# Patient Record
Sex: FEMALE | Race: BLACK OR AFRICAN AMERICAN | NOT HISPANIC OR LATINO | URBAN - METROPOLITAN AREA
[De-identification: names, ages, dates, MRNs, and addresses within clinical notes are randomized per-mention and may not be internally consistent; named-entity substitution may affect disease eponyms.]

---

## 2024-12-04 ENCOUNTER — APPOINTMENT (OUTPATIENT)
Dept: BEHAVIORAL HEALTH | Facility: CLINIC | Age: 89
End: 2024-12-04
Payer: MEDICARE

## 2024-12-04 VITALS
HEART RATE: 80 BPM | DIASTOLIC BLOOD PRESSURE: 74 MMHG | TEMPERATURE: 97.3 F | WEIGHT: 185 LBS | SYSTOLIC BLOOD PRESSURE: 148 MMHG

## 2024-12-04 DIAGNOSIS — F03.B0: ICD-10-CM

## 2024-12-04 PROBLEM — N05.9 GLOMERULONEPHRITIS: Status: ACTIVE | Noted: 2024-12-04

## 2024-12-04 PROBLEM — M19.90 ARTHRITIS: Status: ACTIVE | Noted: 2024-12-04

## 2024-12-04 PROBLEM — I10 HTN (HYPERTENSION): Status: ACTIVE | Noted: 2024-12-04

## 2024-12-04 PROBLEM — Z79.01 ANTICOAGULATED ON COUMADIN: Status: ACTIVE | Noted: 2024-12-04

## 2024-12-04 PROBLEM — E03.8 OTHER SPECIFIED HYPOTHYROIDISM: Status: ACTIVE | Noted: 2024-12-04

## 2024-12-04 PROBLEM — M51.369 LUMBAR DEGENERATIVE DISC DISEASE: Status: ACTIVE | Noted: 2024-12-04

## 2024-12-04 PROBLEM — I25.2 HISTORY OF HEART ATTACK: Status: ACTIVE | Noted: 2024-12-04

## 2024-12-04 PROBLEM — R29.6 FALLS: Status: ACTIVE | Noted: 2024-12-04

## 2024-12-04 PROCEDURE — 1159F MED LIST DOCD IN RCRD: CPT | Performed by: PSYCHIATRY & NEUROLOGY

## 2024-12-04 PROCEDURE — 3077F SYST BP >= 140 MM HG: CPT | Performed by: PSYCHIATRY & NEUROLOGY

## 2024-12-04 PROCEDURE — 1126F AMNT PAIN NOTED NONE PRSNT: CPT | Performed by: PSYCHIATRY & NEUROLOGY

## 2024-12-04 PROCEDURE — 90792 PSYCH DIAG EVAL W/MED SRVCS: CPT | Performed by: PSYCHIATRY & NEUROLOGY

## 2024-12-04 PROCEDURE — 1160F RVW MEDS BY RX/DR IN RCRD: CPT | Performed by: PSYCHIATRY & NEUROLOGY

## 2024-12-04 PROCEDURE — 1157F ADVNC CARE PLAN IN RCRD: CPT | Performed by: PSYCHIATRY & NEUROLOGY

## 2024-12-04 PROCEDURE — 3078F DIAST BP <80 MM HG: CPT | Performed by: PSYCHIATRY & NEUROLOGY

## 2024-12-04 RX ORDER — LEVOTHYROXINE SODIUM 50 UG/1
50 TABLET ORAL DAILY
COMMUNITY

## 2024-12-04 RX ORDER — LACTULOSE 10 G/15ML
SOLUTION ORAL
COMMUNITY

## 2024-12-04 RX ORDER — WARFARIN 6 MG/1
6 TABLET ORAL
COMMUNITY

## 2024-12-04 RX ORDER — DILTIAZEM HYDROCHLORIDE 180 MG/1
180 CAPSULE, COATED, EXTENDED RELEASE ORAL DAILY
COMMUNITY

## 2024-12-04 RX ORDER — DONEPEZIL HYDROCHLORIDE 5 MG/1
TABLET, FILM COATED ORAL
Qty: 28 TABLET | Refills: 0 | Status: SHIPPED | OUTPATIENT
Start: 2024-12-04

## 2024-12-04 RX ORDER — ATORVASTATIN CALCIUM 40 MG/1
40 TABLET, FILM COATED ORAL DAILY
COMMUNITY

## 2024-12-04 RX ORDER — MECLIZINE HYDROCHLORIDE 25 MG/1
25 TABLET ORAL 3 TIMES DAILY PRN
COMMUNITY

## 2024-12-04 RX ORDER — WARFARIN 4 MG/1
4.5 TABLET ORAL 2 TIMES WEEKLY
COMMUNITY

## 2024-12-04 RX ORDER — DONEPEZIL HYDROCHLORIDE 10 MG/1
TABLET, FILM COATED ORAL
Qty: 30 TABLET | Refills: 1 | Status: SHIPPED | OUTPATIENT
Start: 2024-12-04

## 2024-12-04 ASSESSMENT — ENCOUNTER SYMPTOMS
CONFUSION: 1
HYPERACTIVE: 0
DYSPHORIC MOOD: 0
DIZZINESS: 1
AGITATION: 0
FATIGUE: 1
SEIZURES: 0
HALLUCINATIONS: 0
SLEEP DISTURBANCE: 0
TREMORS: 0
NERVOUS/ANXIOUS: 0
DECREASED CONCENTRATION: 1
APPETITE CHANGE: 0

## 2024-12-04 ASSESSMENT — COLUMBIA-SUICIDE SEVERITY RATING SCALE - C-SSRS
1. SINCE LAST CONTACT, HAVE YOU WISHED YOU WERE DEAD OR WISHED YOU COULD GO TO SLEEP AND NOT WAKE UP?: NO
2. HAVE YOU ACTUALLY HAD ANY THOUGHTS OF KILLING YOURSELF?: NO
1. HAVE YOU WISHED YOU WERE DEAD OR WISHED YOU COULD GO TO SLEEP AND NOT WAKE UP?: NO
2. HAVE YOU ACTUALLY HAD ANY THOUGHTS OF KILLING YOURSELF?: NO

## 2024-12-04 ASSESSMENT — PAIN SCALES - GENERAL: PAINLEVEL_OUTOF10: 0-NO PAIN
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
## 2025-03-05 ENCOUNTER — APPOINTMENT (OUTPATIENT)
Dept: BEHAVIORAL HEALTH | Facility: CLINIC | Age: 89
End: 2025-03-05
Payer: MEDICARE

## 2025-04-16 ENCOUNTER — APPOINTMENT (OUTPATIENT)
Dept: BEHAVIORAL HEALTH | Facility: CLINIC | Age: 89
End: 2025-04-16
Payer: MEDICARE

## 2025-05-07 ENCOUNTER — APPOINTMENT (OUTPATIENT)
Dept: BEHAVIORAL HEALTH | Facility: CLINIC | Age: 89
End: 2025-05-07
Payer: MEDICARE

## 2025-06-18 ENCOUNTER — APPOINTMENT (OUTPATIENT)
Dept: BEHAVIORAL HEALTH | Facility: CLINIC | Age: 89
End: 2025-06-18
Payer: MEDICARE

## 2025-06-18 DIAGNOSIS — F03.B0: ICD-10-CM

## 2025-06-18 DIAGNOSIS — F43.10 PTSD (POST-TRAUMATIC STRESS DISORDER): ICD-10-CM

## 2025-06-18 PROCEDURE — 1160F RVW MEDS BY RX/DR IN RCRD: CPT | Performed by: PSYCHIATRY & NEUROLOGY

## 2025-06-18 PROCEDURE — 99214 OFFICE O/P EST MOD 30 MIN: CPT | Performed by: PSYCHIATRY & NEUROLOGY

## 2025-06-18 PROCEDURE — 1157F ADVNC CARE PLAN IN RCRD: CPT | Performed by: PSYCHIATRY & NEUROLOGY

## 2025-06-18 PROCEDURE — 1159F MED LIST DOCD IN RCRD: CPT | Performed by: PSYCHIATRY & NEUROLOGY

## 2025-06-18 RX ORDER — SERTRALINE HYDROCHLORIDE 25 MG/1
25 TABLET, FILM COATED ORAL DAILY
Qty: 30 TABLET | Refills: 0 | Status: SHIPPED | OUTPATIENT
Start: 2025-06-18 | End: 2025-07-18

## 2025-06-18 ASSESSMENT — ENCOUNTER SYMPTOMS
NERVOUS/ANXIOUS: 1
APPETITE CHANGE: 0
VOMITING: 0
TREMORS: 0
HALLUCINATIONS: 0
DECREASED CONCENTRATION: 1
NAUSEA: 0
SLEEP DISTURBANCE: 0
SEIZURES: 0
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
## 2025-07-23 ENCOUNTER — APPOINTMENT (OUTPATIENT)
Dept: BEHAVIORAL HEALTH | Facility: CLINIC | Age: 89
End: 2025-07-23
Payer: MEDICARE

## 2025-07-23 VITALS
HEART RATE: 92 BPM | DIASTOLIC BLOOD PRESSURE: 64 MMHG | RESPIRATION RATE: 16 BRPM | TEMPERATURE: 97.4 F | SYSTOLIC BLOOD PRESSURE: 111 MMHG

## 2025-07-23 DIAGNOSIS — F03.B0: ICD-10-CM

## 2025-07-23 DIAGNOSIS — F43.10 PTSD (POST-TRAUMATIC STRESS DISORDER): ICD-10-CM

## 2025-07-23 PROCEDURE — 1126F AMNT PAIN NOTED NONE PRSNT: CPT | Performed by: PSYCHIATRY & NEUROLOGY

## 2025-07-23 PROCEDURE — 3078F DIAST BP <80 MM HG: CPT | Performed by: PSYCHIATRY & NEUROLOGY

## 2025-07-23 PROCEDURE — 3074F SYST BP LT 130 MM HG: CPT | Performed by: PSYCHIATRY & NEUROLOGY

## 2025-07-23 PROCEDURE — 99214 OFFICE O/P EST MOD 30 MIN: CPT | Performed by: PSYCHIATRY & NEUROLOGY

## 2025-07-23 PROCEDURE — 1159F MED LIST DOCD IN RCRD: CPT | Performed by: PSYCHIATRY & NEUROLOGY

## 2025-07-23 RX ORDER — ALBUTEROL SULFATE 90 UG/1
2 INHALANT RESPIRATORY (INHALATION) EVERY 4 HOURS PRN
COMMUNITY
Start: 2025-07-03

## 2025-07-23 RX ORDER — VIT C/E/ZN/COPPR/LUTEIN/ZEAXAN 250MG-90MG
125 CAPSULE ORAL
COMMUNITY
Start: 2024-07-08 | End: 2025-08-18

## 2025-07-23 RX ORDER — FUROSEMIDE 40 MG/1
60 TABLET ORAL
COMMUNITY
Start: 2025-05-29 | End: 2026-05-24

## 2025-07-23 RX ORDER — FLUTICASONE PROPIONATE AND SALMETEROL 250; 50 UG/1; UG/1
250 POWDER RESPIRATORY (INHALATION) 2 TIMES DAILY
COMMUNITY
Start: 2025-04-28

## 2025-07-23 RX ORDER — LANOLIN ALCOHOL/MO/W.PET/CERES
1000 CREAM (GRAM) TOPICAL DAILY
COMMUNITY

## 2025-07-23 RX ORDER — FERROUS SULFATE 325(65) MG
1 TABLET ORAL DAILY
COMMUNITY

## 2025-07-23 RX ORDER — CARBOXYMETHYLCELLULOSE SODIUM 10 MG/ML
1 GEL OPHTHALMIC 2 TIMES DAILY PRN
COMMUNITY
Start: 2025-06-17 | End: 2025-09-15

## 2025-07-23 RX ORDER — SERTRALINE HYDROCHLORIDE 25 MG/1
25 TABLET, FILM COATED ORAL DAILY
Qty: 90 TABLET | Refills: 0 | Status: SHIPPED | OUTPATIENT
Start: 2025-07-23 | End: 2025-10-21

## 2025-07-23 RX ORDER — RIVASTIGMINE 4.6 MG/24H
1 PATCH, EXTENDED RELEASE TRANSDERMAL DAILY
Qty: 90 PATCH | Refills: 0 | Status: SHIPPED | OUTPATIENT
Start: 2025-07-23 | End: 2025-10-21

## 2025-07-23 RX ORDER — TIOTROPIUM BROMIDE 18 UG/1
18 CAPSULE ORAL; RESPIRATORY (INHALATION)
COMMUNITY
Start: 2019-07-17 | End: 2026-05-29

## 2025-07-23 RX ORDER — CICLOPIROX 80 MG/ML
1 SOLUTION TOPICAL DAILY
COMMUNITY
Start: 2025-07-21

## 2025-07-23 RX ORDER — PERMETHRIN 50 MG/G
1 CREAM TOPICAL ONCE
COMMUNITY
Start: 2025-07-20

## 2025-07-23 RX ORDER — IPRATROPIUM BROMIDE AND ALBUTEROL SULFATE 2.5; .5 MG/3ML; MG/3ML
3 SOLUTION RESPIRATORY (INHALATION) 2 TIMES DAILY
COMMUNITY
Start: 2025-07-03

## 2025-07-23 RX ORDER — ASPIRIN 81 MG/1
81 TABLET ORAL DAILY
COMMUNITY

## 2025-07-23 ASSESSMENT — COLUMBIA-SUICIDE SEVERITY RATING SCALE - C-SSRS
1. SINCE LAST CONTACT, HAVE YOU WISHED YOU WERE DEAD OR WISHED YOU COULD GO TO SLEEP AND NOT WAKE UP?: NO
2. HAVE YOU ACTUALLY HAD ANY THOUGHTS OF KILLING YOURSELF?: NO
1. HAVE YOU WISHED YOU WERE DEAD OR WISHED YOU COULD GO TO SLEEP AND NOT WAKE UP?: NO

## 2025-07-23 ASSESSMENT — ENCOUNTER SYMPTOMS
TREMORS: 0
DYSPHORIC MOOD: 0
SLEEP DISTURBANCE: 0
NERVOUS/ANXIOUS: 0
NAUSEA: 0
DECREASED CONCENTRATION: 1
DIARRHEA: 0
APPETITE CHANGE: 0
VOMITING: 0
BRUISES/BLEEDS EASILY: 1
AGITATION: 0
SEIZURES: 0

## 2025-07-23 ASSESSMENT — PAIN SCALES - GENERAL: PAINLEVEL_OUTOF10: 0-NO PAIN

## 2025-07-23 NOTE — PROGRESS NOTES
Subjective   Patient ID: Анна Angel is a 89 y.o. female who presents for follow up     HPI  Daughter was present   We started Zoloft last nery, mood is better. Mood is better, no daily depression or sadness. Sleep is good. Appetite is fine. Asks her daughter sometimes about her  son and  if they came back to the house.   Memory is impaired. More of short term.Needs assistance in instrumental and basic activities.     Daughter stated behavior is better   Previously on Rivastigmine patch, but she was not fully adherent of it. She tolerated it.   Review of Systems   Constitutional:  Negative for appetite change.   Gastrointestinal:  Negative for diarrhea, nausea and vomiting.   Musculoskeletal:  Positive for gait problem.   Neurological:  Negative for tremors and seizures.   Hematological:  Bruises/bleeds easily.        No bleeding, has mild bruising before Zoloft, not worsening    Psychiatric/Behavioral:  Positive for decreased concentration. Negative for agitation, behavioral problems, dysphoric mood, sleep disturbance and suicidal ideas. The patient is not nervous/anxious.        Objective   Physical Exam    Psychiatric:         Attention and Perception: Attention normal.         Mood and Affect: Mood normal.         Speech: Speech normal.         Behavior: Behavior is cooperative.         Thought Content: Thought content normal.         Cognition and Memory: Cognition is impaired.         Judgment: Judgment is inappropriate.       Vitals:    25 1013   BP: 111/64   Pulse: 92   Resp: 16   Temp: 36.3 °C (97.4 °F)      No visits with results within 6 Month(s) from this visit.   Latest known visit with results is:   Legacy Encounter on 2019   Component Date Value    Ventricular Rate 2019 106     Atrial Rate 2019 144     QRS Duration 2019 68     QT Interval 2019 350     QTC Calculation(Bazett) 2019 464     R Axis 2019 49     T [place] 2019 -22     QRS  Count 07/31/2019 18     Q Onset 07/31/2019 229     T Offset 07/31/2019 404     QTC Fredericia 07/31/2019 422      Lab Results   Component Value Date     08/01/2019     08/01/2019     07/31/2019    K 4.3 08/01/2019    K 4.1 08/01/2019    K 4.4 07/31/2019    CO2 28 08/01/2019    CO2 29 08/01/2019    CO2 29 07/31/2019    BUN 8 08/01/2019    BUN 9 08/01/2019    BUN 14 07/31/2019    CREATININE 0.88 08/01/2019    CREATININE 0.87 08/01/2019    CREATININE 0.97 07/31/2019    GLUCOSE 112 (H) 08/01/2019    GLUCOSE 113 (H) 08/01/2019    GLUCOSE 112 (H) 07/31/2019    CALCIUM 9.5 08/01/2019    CALCIUM 9.3 08/01/2019    CALCIUM 9.5 07/31/2019     Lab Results   Component Value Date    WBC 12.0 (H) 08/01/2019    HGB 8.9 (L) 08/01/2019    HCT 30.6 (L) 08/01/2019    MCV 82 08/01/2019     08/01/2019     Lab Results   Component Value Date    CHOL 127 07/09/2019    TRIG 191 (H) 07/09/2019    HDL 41.5 07/09/2019     Assessment/Plan   Problem List Items Addressed This Visit           ICD-10-CM    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

## 2025-09-24 ENCOUNTER — APPOINTMENT (OUTPATIENT)
Dept: BEHAVIORAL HEALTH | Facility: CLINIC | Age: 89
End: 2025-09-24
Payer: MEDICARE